# Patient Record
Sex: MALE | Race: BLACK OR AFRICAN AMERICAN
[De-identification: names, ages, dates, MRNs, and addresses within clinical notes are randomized per-mention and may not be internally consistent; named-entity substitution may affect disease eponyms.]

---

## 2019-09-06 ENCOUNTER — HOSPITAL ENCOUNTER (OUTPATIENT)
Dept: HOSPITAL 61 - KCIC | Age: 59
Discharge: HOME | End: 2019-09-06
Attending: FAMILY MEDICINE
Payer: COMMERCIAL

## 2019-09-06 DIAGNOSIS — M25.562: ICD-10-CM

## 2019-09-06 DIAGNOSIS — M76.892: Primary | ICD-10-CM

## 2019-09-06 PROCEDURE — 73562 X-RAY EXAM OF KNEE 3: CPT

## 2019-09-06 NOTE — [facility]
EXAM: AP, oblique and lateral views of the left knee

 

DATE: 9/6/2019 12:00 AM

 

INDICATION: Left knee pain

 

COMPARISON: No Prior

 

FINDINGS:

No evidence of acute fracture or dislocation. Bipartite patella. No left 

knee joint effusion. Patellar enthesopathy.

 

IMPRESSION:

No evidence of acute fracture or dislocation.

 

Electronically signed by: Fernando Ahumada MD (9/6/2019 4:54 PM) [facility]